# Patient Record
Sex: FEMALE | Race: ASIAN | Employment: OTHER | URBAN - METROPOLITAN AREA
[De-identification: names, ages, dates, MRNs, and addresses within clinical notes are randomized per-mention and may not be internally consistent; named-entity substitution may affect disease eponyms.]

---

## 2018-02-07 ENCOUNTER — OFFICE VISIT (OUTPATIENT)
Dept: URGENT CARE | Facility: URGENT CARE | Age: 23
End: 2018-02-07
Payer: COMMERCIAL

## 2018-02-07 VITALS
RESPIRATION RATE: 18 BRPM | OXYGEN SATURATION: 100 % | TEMPERATURE: 97.4 F | DIASTOLIC BLOOD PRESSURE: 76 MMHG | WEIGHT: 135.38 LBS | SYSTOLIC BLOOD PRESSURE: 115 MMHG | HEART RATE: 66 BPM

## 2018-02-07 DIAGNOSIS — R07.89 MUSCULOSKELETAL CHEST PAIN: Primary | ICD-10-CM

## 2018-02-07 PROCEDURE — 99213 OFFICE O/P EST LOW 20 MIN: CPT | Performed by: PHYSICIAN ASSISTANT

## 2018-02-07 RX ORDER — NAPROXEN 500 MG/1
500 TABLET ORAL 2 TIMES DAILY WITH MEALS
Qty: 60 TABLET | Refills: 0 | Status: SHIPPED | OUTPATIENT
Start: 2018-02-07

## 2018-02-07 NOTE — MR AVS SNAPSHOT
"              After Visit Summary   2/7/2018    Adelina Wray    MRN: [id number]           Patient Information     Date Of Birth          1995        Visit Information        Provider Department      2/7/2018 7:30 PM Kerrie Louis PA-C [facility]        Today's Diagnoses     Musculoskeletal chest pain    -  1      Care Instructions    (R07.89) Musculoskeletal chest pain  (primary encounter diagnosis)  Comment:   Plan: ranitidine (ZANTAC) 150 MG tablet, naproxen         (NAPROSYN) 500 MG tablet          Stop lifting weights for now.      Establish care with internal medicine and follow up within 2 weeks, sooner should symptoms persist or worsen.                Follow-ups after your visit        Who to contact     If you have questions or need follow up information about today's clinic visit or your schedule please contact [facility] directly at [phone].  Normal or non-critical lab and imaging results will be communicated to you by Badoohart, letter or phone within 4 business days after the clinic has received the results. If you do not hear from us within 7 days, please contact the clinic through Badoohart or phone. If you have a critical or abnormal lab result, we will notify you by phone as soon as possible.  Submit refill requests through [employer] or call your pharmacy and they will forward the refill request to us. Please allow 3 business days for your refill to be completed.          Additional Information About Your Visit        MyChart Information     [employer] lets you send messages to your doctor, view your test results, renew your prescriptions, schedule appointments and more. To sign up, go to [email or web address] . Click on \"Log in\" on the left side of the screen, which will take you to the Welcome page. Then click on \"Sign up Now\" on the right side of the page.     You will be asked to enter the access code listed below, as well " as some personal information. Please follow the directions to create your username and password.     Your access code is: [email or web address]  Expires: 2018  8:02 PM     Your access code will  in 90 days. If you need help or a new code, please call your [place] clinic or [phone].        Care EveryWhere ID     This is your Care EveryWhere ID. This could be used by other organizations to access your [place] medical records  [id number]        Your Vitals Were     Pulse Temperature Respirations Pulse Oximetry          66 97.4  F (36.3  C) (Oral) 18 100%         Blood Pressure from Last 3 Encounters:   18 115/76    Weight from Last 3 Encounters:   18 135 lb 6 oz (61.4 kg)   04/23/10 125 lb (56.7 kg) (57 %)*   10/22/07 138 lb 9.6 oz (62.9 kg) (85 %)*     * Growth percentiles are based on [facility] 2-20 Years data.              Today, you had the following     No orders found for display         Today's Medication Changes          These changes are accurate as of 18  8:02 PM.  If you have any questions, ask your nurse or doctor.               Start taking these medicines.        Dose/Directions    naproxen 500 MG tablet   Commonly known as:  NAPROSYN   Used for:  Musculoskeletal chest pain   Started by:  eKrrie Louis PA-C        Dose:  500 mg   Take 1 tablet (500 mg) by mouth 2 times daily (with meals)   Quantity:  60 tablet   Refills:  0       ranitidine 150 MG tablet   Commonly known as:  ZANTAC   Used for:  Musculoskeletal chest pain   Started by:  Kerrie Louis PA-C        Dose:  150 mg   Take 1 tablet (150 mg) by mouth 2 times daily   Quantity:  60 tablet   Refills:  1            Where to get your medicines      These medications were sent to [facility] [address] [address]     Phone:  [phone]     naproxen 500 MG tablet    ranitidine 150 MG tablet                Primary Care Provider Office  Phone # Fax #    Amanda Wilson [phone]       [facility] [address]  [facility] [address]        Equal Access to Services     TOBY ROBERTS : Hadii ephraim ku hadasho Soomaali, waaxda luqadaha, qaybta kaalmada adeegyada, waxanne snyderin elviran adesheba prabhakar laCharityroseann barrera. So [facility] [phone].    ATENCIÓN: Si habla español, tiene a rebolledo disposición servicios gratuitos de asistencia lingüística. Llame al [phone].    We comply with applicable federal civil rights laws and [place] laws. We do not discriminate on the basis of race, color, national origin, age, disability, sex, sexual orientation, or gender identity.            Thank you!     Thank you for choosing [facility]  for your care. Our goal is always to provide you with excellent care. Hearing back from our patients is one way we can continue to improve our services. Please take a few minutes to complete the written survey that you may receive in the mail after your visit with us. Thank you!             Your Updated Medication List - Protect others around you: Learn how to safely use, store and throw away your medicines at www.disposemymeds.org.          This list is accurate as of 2/7/18  8:02 PM.  Always use your most recent med list.                   Brand Name Dispense Instructions for use Diagnosis    CLARINEX 5 MG tablet   Generic drug:  desloratadine      1 tablet daily        MUCINEX D PO      1 tablet daily        naproxen 500 MG tablet    NAPROSYN    60 tablet    Take 1 tablet (500 mg) by mouth 2 times daily (with meals)    Musculoskeletal chest pain       ranitidine 150 MG tablet    ZANTAC    60 tablet    Take 1 tablet (150 mg) by mouth 2 times daily    Musculoskeletal chest pain

## 2018-02-08 NOTE — PROGRESS NOTES
SUBJECTIVE:  Chief Complaint   Patient presents with     Urgent Care     off/on random pain over various parts of body for 8-9 months, this evening she notice off/on burning pain in chest.      Adelina Wray is a 22 year old female presents with a chief complaint of   1) intermittent generalized body pains over the past 8-9 months.  No triggers and nothing makes it better.  Pain lasts for moments and resolves spontaneously.  She has tried advil, exedrine and pepto bismal.    2) today she noticed the same pain, but in her chest.  Onset of pain was about 30 minutes ago.  Pain comes and goes.  Sharp.    Some discomfort with deep breath.    No cough.    No wheezing.    No fevers.    Denies any nausea or vomiting.      She has been lifting weights recently, working chest muscles.         Past Medical History:   Diagnosis Date     Allergic state      There is no problem list on file for this patient.    Social History   Substance Use Topics     Smoking status: Passive Smoke Exposure - Never Smoker     Smokeless tobacco: Never Used      Comment: has some exposure     Alcohol use No       FH:  Father has sickle cell (she was tested as a child and does not have the trait)    ROS:  CONSTITUTIONAL:NEGATIVE for fever, chills, change in weight  INTEGUMENTARY/SKIN: NEGATIVE for worrisome rashes, moles or lesions  EYES: NEGATIVE for vision changes or irritation  ENT/MOUTH: NEGATIVE for ear, mouth and throat problems  RESP:as per HPI  GI: as per HPI  : normal menstrual cycles  MUSCULOSKELETAL: as per HPI    EXAM:   /76  Pulse 66  Temp 97.4  F (36.3  C) (Oral)  Resp 18  Wt 135 lb 6 oz (61.4 kg)  SpO2 100%    GENERAL APPEARANCE: healthy, alert and no distress  CHEST: clear to auscultation.  Tenderness over the area of concern between ribs on left upper chest    CV: regular rate and rhythm  EXTREMITIES: peripheral pulses normal  SKIN: no suspicious lesions or rashes  NEURO: Normal strength and tone, sensory exam grossly  normal, mentation intact and speech normal    (R07.89) Musculoskeletal chest pain  (primary encounter diagnosis)  Comment:   Plan: ranitidine (ZANTAC) 150 MG tablet, naproxen         (NAPROSYN) 500 MG tablet               Stop lifting weights for now.      Establish care with internal medicine and follow up within 2 weeks, sooner should symptoms persist or worsen.    Handout on IM providers given  Patient expresses understanding and agreement with the assessment and plan as above.

## 2018-02-08 NOTE — PATIENT INSTRUCTIONS
(R07.89) Musculoskeletal chest pain  (primary encounter diagnosis)  Comment:   Plan: ranitidine (ZANTAC) 150 MG tablet, naproxen         (NAPROSYN) 500 MG tablet          Stop lifting weights for now.      Establish care with internal medicine and follow up within 2 weeks, sooner should symptoms persist or worsen.

## 2018-02-14 ENCOUNTER — TELEPHONE (OUTPATIENT)
Dept: URGENT CARE | Facility: URGENT CARE | Age: 23
End: 2018-02-14

## 2018-02-14 NOTE — TELEPHONE ENCOUNTER
Were you satisfied with the wait time to see your provider? yes     Do you feel your provider took the time to listen to your concerns?  yes     Would you recommend our Urgent Care services to others?  yes     Comments: Patient extremely satisfied with her provider and the services. Stated she is usually terrified of going to the doctors, but her provider made her feel very comfortable.     Appointment scheduled? Was already scheduled for March     Location? [facility]

## 2018-05-16 ENCOUNTER — OFFICE VISIT (OUTPATIENT)
Dept: URGENT CARE | Facility: URGENT CARE | Age: 23
End: 2018-05-16
Payer: COMMERCIAL

## 2018-05-16 VITALS
TEMPERATURE: 97.6 F | WEIGHT: 132.3 LBS | DIASTOLIC BLOOD PRESSURE: 66 MMHG | SYSTOLIC BLOOD PRESSURE: 80 MMHG | HEART RATE: 50 BPM | OXYGEN SATURATION: 100 % | RESPIRATION RATE: 18 BRPM

## 2018-05-16 DIAGNOSIS — J30.2 ACUTE SEASONAL ALLERGIC RHINITIS, UNSPECIFIED TRIGGER: ICD-10-CM

## 2018-05-16 DIAGNOSIS — K08.9 DENTAL DISORDER: Primary | ICD-10-CM

## 2018-05-16 PROCEDURE — 99214 OFFICE O/P EST MOD 30 MIN: CPT | Performed by: PHYSICIAN ASSISTANT

## 2018-05-16 RX ORDER — AMOXICILLIN 875 MG
875 TABLET ORAL 2 TIMES DAILY
Qty: 20 TABLET | Refills: 0 | Status: SHIPPED | OUTPATIENT
Start: 2018-05-16

## 2018-05-16 RX ORDER — FLUTICASONE PROPIONATE 50 MCG
2 SPRAY, SUSPENSION (ML) NASAL DAILY
Qty: 1 BOTTLE | Refills: 1 | Status: SHIPPED | OUTPATIENT
Start: 2018-05-16

## 2018-05-16 NOTE — PATIENT INSTRUCTIONS
(K08.9) Dental disorder  (primary encounter diagnosis)  Comment:   Plan: amoxicillin (AMOXIL) 875 MG tablet        Follow up with dentist if no improvement.      (J30.2) Acute seasonal allergic rhinitis, unspecified trigger  Comment:   Plan: fluticasone (FLONASE) 50 MCG/ACT spray 2 sprays each nostril at bedtime (BUT TODAY DO IT TWICE)        Continue claritin, consider increasing it to twice a day during this acute exacerbation.     Prior to flying on plane, use AFRIN (or similar) 2 sprays each nostril, then you may use it again on plane during descent should you develop ear discomfort.  It is only to be used for 3 days in a row at max.  I suggest just using it when you travel by plane.

## 2018-05-16 NOTE — LETTER
[facility] [facility]  [address] [address]  [phone]      May 16, 2018    RE:  Adelina Wray                                                                                                                                                       [address]  [facility] [address]            To whom it may concern:    Adelina Wray was seen in clinic today.          Sincerely,        Kerrie Gonzalez PAC    [place] [facility]

## 2018-05-16 NOTE — MR AVS SNAPSHOT
After Visit Summary   5/16/2018    Adelina Wray    MRN: [id number]           Patient Information     Date Of Birth          1995        Visit Information        Provider Department      5/16/2018 2:10 PM Kerrie Louis PA-C [facility]        Today's Diagnoses     Dental disorder    -  1    Acute seasonal allergic rhinitis, unspecified trigger          Care Instructions    (K08.9) Dental disorder  (primary encounter diagnosis)  Comment:   Plan: amoxicillin (AMOXIL) 875 MG tablet        Follow up with dentist if no improvement.      (J30.2) Acute seasonal allergic rhinitis, unspecified trigger  Comment:   Plan: fluticasone (FLONASE) 50 MCG/ACT spray 2 sprays each nostril at bedtime (BUT TODAY DO IT TWICE)        Continue claritin, consider increasing it to twice a day during this acute exacerbation.     Prior to flying on plane, use AFRIN (or similar) 2 sprays each nostril, then you may use it again on plane during descent should you develop ear discomfort.  It is only to be used for 3 days in a row at max.  I suggest just using it when you travel by plane.                Follow-ups after your visit        Who to contact     If you have questions or need follow up information about today's clinic visit or your schedule please contact [facility] directly at [phone].  Normal or non-critical lab and imaging results will be communicated to you by MyChart, letter or phone within 4 business days after the clinic has received the results. If you do not hear from us within 7 days, please contact the clinic through MyChart or phone. If you have a critical or abnormal lab result, we will notify you by phone as soon as possible.  Submit refill requests through [employer] or call your pharmacy and they will forward the refill request to us. Please allow 3 business days for your refill to be completed.          Additional Information About  "Your Visit        Solsticehart Information     Openet lets you send messages to your doctor, view your test results, renew your prescriptions, schedule appointments and more. To sign up, go to [email or web address] . Click on \"Log in\" on the left side of the screen, which will take you to the Welcome page. Then click on \"Sign up Now\" on the right side of the page.     You will be asked to enter the access code listed below, as well as some personal information. Please follow the directions to create your username and password.     Your access code is: [email or web address]  Expires: 2018  3:09 PM     Your access code will  in 90 days. If you need help or a new code, please call your [place] clinic or [phone].        Care EveryWhere ID     This is your Care EveryWhere ID. This could be used by other organizations to access your [place] medical records  [id number]        Your Vitals Were     Pulse Temperature Respirations Pulse Oximetry          50 97.6  F (36.4  C) (Oral) 18 100%         Blood Pressure from Last 3 Encounters:   18 (!) 80/66   18 115/76    Weight from Last 3 Encounters:   18 132 lb 4.8 oz (60 kg)   18 135 lb 6 oz (61.4 kg)   04/23/10 125 lb (56.7 kg) (57 %)*     * Growth percentiles are based on [facility] 2-20 Years data.              Today, you had the following     No orders found for display         Today's Medication Changes          These changes are accurate as of 18  3:09 PM.  If you have any questions, ask your nurse or doctor.               Start taking these medicines.        Dose/Directions    amoxicillin 875 MG tablet   Commonly known as:  AMOXIL   Used for:  Dental disorder   Started by:  Kerrie Louis PA-C        Dose:  875 mg   Take 1 tablet (875 mg) by mouth 2 times daily   Quantity:  20 tablet   Refills:  0       fluticasone 50 MCG/ACT spray   Commonly known as:  FLONASE   Used for:  Acute seasonal allergic rhinitis, unspecified trigger "   Started by:  Kerrie Louis PA-C        Dose:  2 spray   Spray 2 sprays into both nostrils daily   Quantity:  1 Bottle   Refills:  1            Where to get your medicines      These medications were sent to [facility] [place], [place] - [address]  [address], [facility] [address]     Phone:  [phone]     amoxicillin 875 MG tablet    fluticasone 50 MCG/ACT spray                Primary Care Provider Office Phone # Fax #    Amanda Wilson [phone]       [facility] [address]  [facility] [address]        Equal Access to Services     DOROTHY ROBERTS : Hadii aad ku hadasho Soomaali, waaxda luqadaha, qaybta kaalmada adeegyada, waxay lillianin haycuaten gianluca medina . So [facility] [phone].    ATENCIÓN: Si habla español, tiene a rebolledo disposición servicios gratuitos de asistencia lingüística. Llame al [phone].    We comply with applicable federal civil rights laws and [place] laws. We do not discriminate on the basis of race, color, national origin, age, disability, sex, sexual orientation, or gender identity.            Thank you!     Thank you for choosing [facility]  for your care. Our goal is always to provide you with excellent care. Hearing back from our patients is one way we can continue to improve our services. Please take a few minutes to complete the written survey that you may receive in the mail after your visit with us. Thank you!             Your Updated Medication List - Protect others around you: Learn how to safely use, store and throw away your medicines at www.disposemymeds.org.          This list is accurate as of 5/16/18  3:09 PM.  Always use your most recent med list.                   Brand Name Dispense Instructions for use Diagnosis    amoxicillin 875 MG tablet    AMOXIL    20 tablet    Take 1 tablet (875 mg) by mouth 2 times daily    Dental disorder       CLARINEX 5 MG tablet    Generic drug:  desloratadine      1 tablet daily        fluticasone 50 MCG/ACT spray    FLONASE    1 Bottle    Spray 2 sprays into both nostrils daily    Acute seasonal allergic rhinitis, unspecified trigger       MUCINEX D PO      1 tablet daily        naproxen 500 MG tablet    NAPROSYN    60 tablet    Take 1 tablet (500 mg) by mouth 2 times daily (with meals)    Musculoskeletal chest pain       ranitidine 150 MG tablet    ZANTAC    60 tablet    Take 1 tablet (150 mg) by mouth 2 times daily    Musculoskeletal chest pain

## 2018-05-16 NOTE — PROGRESS NOTES
SUBJECTIVE:   Adelina Wray is a 22 year old female presenting with a chief complaint of swelling of her left upper gums for 4 days  Also a sore throat and runny nose.   No fevers.      SH: she is traveling to [place] this weekend.      Treatment measures tried include Tylenol/Ibuprofen.  Predisposing factors include some allergies.    Past Medical History:   Diagnosis Date     Allergic state      There is no problem list on file for this patient.    Social History   Substance Use Topics     Smoking status: Passive Smoke Exposure - Never Smoker     Smokeless tobacco: Never Used      Comment: has some exposure     Alcohol use No       ROS:  CONSTITUTIONAL:NEGATIVE for fever, chills, change in weight  INTEGUMENTARY/SKIN: NEGATIVE for worrisome rashes, moles or lesions  EYES: NEGATIVE for vision changes or irritation  ENT/MOUTH: as per HPI  RESP:NEGATIVE for significant cough or SOB  CV: NEGATIVE for chest pain, palpitations or peripheral edema    OBJECTIVE  :BP (!) 80/66  Pulse 50  Temp 97.6  F (36.4  C) (Oral)  Resp 18  Wt 132 lb 4.8 oz (60 kg)  SpO2 100%  GENERAL APPEARANCE: healthy, alert and no distress  EYES: EOMI,  PERRL, conjunctiva clear  HENT: ear canals and TM's normal.  Nose with boggy turbinates and mouth without ulcers, erythema or lesions  HENT: gingival tissue on upper left gingiva is erythematous and edematous.    NECK: supple, nontender, no lymphadenopathy  RESP: lungs clear to auscultation - no rales, rhonchi or wheezes  CV: regular rates and rhythm, normal S1 S2, no murmur noted  SKIN: no suspicious lesions or rashes    (K08.9) Dental disorder  (primary encounter diagnosis)  Comment:   Plan: amoxicillin (AMOXIL) 875 MG tablet        Follow up with dentist if no improvement.      (J30.2) Acute seasonal allergic rhinitis, unspecified trigger  Comment:   Plan: fluticasone (FLONASE) 50 MCG/ACT spray 2 sprays each nostril at bedtime (BUT TODAY DO IT TWICE)        Continue claritin, consider  increasing it to twice a day during this acute exacerbation.     Prior to flying on plane, use AFRIN (or similar) 2 sprays each nostril, then you may use it again on plane during descent should you develop ear discomfort.  It is only to be used for 3 days in a row at max.  I suggest just using it when you travel by plane.

## 2018-09-15 ENCOUNTER — OFFICE VISIT (OUTPATIENT)
Dept: URGENT CARE | Facility: URGENT CARE | Age: 23
End: 2018-09-15
Payer: COMMERCIAL

## 2018-09-15 VITALS
DIASTOLIC BLOOD PRESSURE: 66 MMHG | TEMPERATURE: 98.5 F | WEIGHT: 130.44 LBS | HEART RATE: 58 BPM | RESPIRATION RATE: 16 BRPM | SYSTOLIC BLOOD PRESSURE: 102 MMHG

## 2018-09-15 DIAGNOSIS — M54.9 UPPER BACK PAIN ON LEFT SIDE: Primary | ICD-10-CM

## 2018-09-15 DIAGNOSIS — G24.3 SPASMODIC TORTICOLLIS: ICD-10-CM

## 2018-09-15 PROCEDURE — 99213 OFFICE O/P EST LOW 20 MIN: CPT | Performed by: PHYSICIAN ASSISTANT

## 2018-09-15 RX ORDER — METHOCARBAMOL 750 MG/1
750 TABLET, FILM COATED ORAL 3 TIMES DAILY
Qty: 21 TABLET | Refills: 0 | Status: SHIPPED | OUTPATIENT
Start: 2018-09-15

## 2018-09-15 RX ORDER — IBUPROFEN 600 MG/1
600 TABLET, FILM COATED ORAL EVERY 6 HOURS PRN
Qty: 30 TABLET | Refills: 1 | Status: SHIPPED | OUTPATIENT
Start: 2018-09-15

## 2018-09-15 NOTE — MR AVS SNAPSHOT
"              After Visit Summary   9/15/2018    Adelina Wray    MRN: [id number]           Patient Information     Date Of Birth          1995        Visit Information        Provider Department      9/15/2018 10:30 AM Jg El PA-C [facility]        Today's Diagnoses     Upper back pain on left side    -  1    Spasmodic torticollis           Follow-ups after your visit        Who to contact     If you have questions or need follow up information about today's clinic visit or your schedule please contact [facility] directly at [phone].  Normal or non-critical lab and imaging results will be communicated to you by [employer], letter or phone within 4 business days after the clinic has received the results. If you do not hear from us within 7 days, please contact the clinic through [employer] or phone. If you have a critical or abnormal lab result, we will notify you by phone as soon as possible.  Submit refill requests through [employer] or call your pharmacy and they will forward the refill request to us. Please allow 3 business days for your refill to be completed.          Additional Information About Your Visit        MyChart Information     [employer] lets you send messages to your doctor, view your test results, renew your prescriptions, schedule appointments and more. To sign up, go to [email or web address] . Click on \"Log in\" on the left side of the screen, which will take you to the Welcome page. Then click on \"Sign up Now\" on the right side of the page.     You will be asked to enter the access code listed below, as well as some personal information. Please follow the directions to create your username and password.     Your access code is: [email or web address]  Expires: 2018 11:53 AM     Your access code will  in 90 days. If you need help or a new code, please call your [facility] clinic or [phone].        Care EveryWhere ID     This is " your Care EveryWhere ID. This could be used by other organizations to access your [facility] medical records  [id number]        Your Vitals Were     Pulse Temperature Respirations             58 98.5  F (36.9  C) (Oral) 16          Blood Pressure from Last 3 Encounters:   09/15/18 102/66   05/16/18 (!) 80/66   02/07/18 115/76    Weight from Last 3 Encounters:   09/15/18 130 lb 7 oz (59.2 kg)   05/16/18 132 lb 4.8 oz (60 kg)   02/07/18 135 lb 6 oz (61.4 kg)              Today, you had the following     No orders found for display         Today's Medication Changes          These changes are accurate as of 9/15/18 11:53 AM.  If you have any questions, ask your nurse or doctor.               Start taking these medicines.        Dose/Directions    ibuprofen 600 MG tablet   Commonly known as:  ADVIL/MOTRIN   Used for:  Upper back pain on left side   Started by:  Jg El PA-C        Dose:  600 mg   Take 1 tablet (600 mg) by mouth every 6 hours as needed for moderate pain   Quantity:  30 tablet   Refills:  1       methocarbamol 750 MG tablet   Commonly known as:  ROBAXIN   Used for:  Upper back pain on left side, Spasmodic torticollis   Started by:  Jg El PA-C        Dose:  750 mg   Take 1 tablet (750 mg) by mouth 3 times daily   Quantity:  21 tablet   Refills:  0            Where to get your medicines      These medications were sent to [facility] [address] [address]     Phone:  [phone]     ibuprofen 600 MG tablet    methocarbamol 750 MG tablet                Primary Care Provider Office Phone # Fax #     BROOKE Wilson [phone]       [facility] [address] [facility] [address]        Equal Access to Services     TOBY ROBERTS AH: Nathanael hilarioo Soomaali, waaxda luqadaha, qaybta kaalmada adeegyada, waxay torsten barrera. So [facility] [phone].    ATENCIÓN: Si  calvin thornton, tiene a rebolledo disposición servicios gratuitos de asistencia lingüística. Fahad palmer [phone].    We comply with applicable federal civil rights laws and [place] laws. We do not discriminate on the basis of race, color, national origin, age, disability, sex, sexual orientation, or gender identity.            Thank you!     Thank you for choosing [facility]  for your care. Our goal is always to provide you with excellent care. Hearing back from our patients is one way we can continue to improve our services. Please take a few minutes to complete the written survey that you may receive in the mail after your visit with us. Thank you!             Your Updated Medication List - Protect others around you: Learn how to safely use, store and throw away your medicines at www.disposemymeds.org.          This list is accurate as of 9/15/18 11:53 AM.  Always use your most recent med list.                   Brand Name Dispense Instructions for use Diagnosis    amoxicillin 875 MG tablet    AMOXIL    20 tablet    Take 1 tablet (875 mg) by mouth 2 times daily    Dental disorder       CLARINEX 5 MG tablet   Generic drug:  desloratadine      1 tablet daily        fluticasone 50 MCG/ACT spray    FLONASE    1 Bottle    Spray 2 sprays into both nostrils daily    Acute seasonal allergic rhinitis, unspecified trigger       ibuprofen 600 MG tablet    ADVIL/MOTRIN    30 tablet    Take 1 tablet (600 mg) by mouth every 6 hours as needed for moderate pain    Upper back pain on left side       methocarbamol 750 MG tablet    ROBAXIN    21 tablet    Take 1 tablet (750 mg) by mouth 3 times daily    Upper back pain on left side, Spasmodic torticollis       MUCINEX D PO      1 tablet daily        naproxen 500 MG tablet    NAPROSYN    60 tablet    Take 1 tablet (500 mg) by mouth 2 times daily (with meals)    Musculoskeletal chest pain       ranitidine 150 MG tablet    ZANTAC    60 tablet    Take 1 tablet  (150 mg) by mouth 2 times daily    Musculoskeletal chest pain

## 2018-09-15 NOTE — PROGRESS NOTES
SUBJECTIVE:  Chief Complaint   Patient presents with     Neck Pain     lt sided neck pain with decreased ROM,woke up today with pain,no known injury     Adelina Wray is a 23 year old female presents with a chief complaint of left side neck tenderness, upper back and spasms .  How: no injury.  The patient complained of mild pain  and has had decreased ROM.  Pain exacerbated by movement.  Relieved by rest.  She treated it initially with no therapy. This is the first time this type of injury has occurred to this patient.     Past Medical History:   Diagnosis Date     Allergic state      Allergies   Allergen Reactions     Cats      Seasonal Allergies      Social History   Substance Use Topics     Smoking status: Passive Smoke Exposure - Never Smoker     Smokeless tobacco: Never Used      Comment: has some exposure     Alcohol use No       ROS:  CONSTITUTIONAL:NEGATIVE for fever, chills, change in weight  INTEGUMENTARY/SKIN: NEGATIVE for worrisome rashes, moles or lesions  ENT/MOUTH: NEGATIVE for ear, mouth and throat problems  RESP:NEGATIVE for significant cough or SOB  CV: NEGATIVE for chest pain, palpitations or peripheral edema  MUSCULOSKELETAL: POSITIVE  for left upper back and neck pain  NEURO: NEGATIVE for weakness, dizziness or paresthesias    EXAM:   /66 (Cuff Size: Adult Regular)  Pulse 58  Temp 98.5  F (36.9  C) (Oral)  Resp 16  Wt 130 lb 7 oz (59.2 kg)  Gen: healthy,alert,no distress  Extremity: DROM of upper arm .   There is not compromise to the distal circulation.  Pulses are +2 and CRT is brisk  NECK:  tenderness to palpation along posterior aspect left side   CHEST: clear to auscultation  CV: regular rate and rhythm  EXTREMITIES: peripheral pulses normal  MS:  tender to palpation left side upper back and neck spasms  SKIN: no suspicious lesions or rashes  NEURO: Normal strength and tone, sensory exam grossly normal, mentation intact and speech normal    ASSESSMENT/PLAN:      ICD-10-CM    1. Upper  back pain on left side M54.9 ibuprofen (ADVIL/MOTRIN) 600 MG tablet     methocarbamol (ROBAXIN) 750 MG tablet   2. Spasmodic torticollis G24.3 methocarbamol (ROBAXIN) 750 MG tablet      alternate ice and warm moist heat  ROM exercises  Follow up as needed

## 2018-09-15 NOTE — LETTER
[facility]  [address] [address]  [phone]      September 15, 2018    RE:  Adelina Wray                                                                                                                                                       [address] [facility] [address]            To whom it may concern:    Adelina Wray was seen in the urgent care today and will miss work.        Sincerely,        Jg El [facility]